# Patient Record
Sex: FEMALE | Race: WHITE | NOT HISPANIC OR LATINO | Employment: STUDENT | URBAN - METROPOLITAN AREA
[De-identification: names, ages, dates, MRNs, and addresses within clinical notes are randomized per-mention and may not be internally consistent; named-entity substitution may affect disease eponyms.]

---
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
## 2018-01-11 NOTE — MISCELLANEOUS
Message  Return to work or school:   Kumar Valenzuela is under my professional care  She was seen in my office on 4/22/16   She is able to return to work on  4/29/16      Bedrest for 1 week          Signatures   Electronically signed by : Ronny Douglas LPN; Apr 18 6098 49:05EQ EST                       (Author)

## 2018-01-15 NOTE — PROGRESS NOTES
Chief Complaint  ppd applied      Active Problems    1  BMI (body mass index), pediatric, 5% to less than 85% for age (V80 51) (Z71 46)   2  Encounter for PPD test (V74 1) (Z11 1)   3  Joint pain (719 40) (M25 50)   4  Lower back pain (724 2) (M54 5)   5  Need for meningococcal vaccination (V03 89) (Z23)   6  Need for prophylactic vaccination and inoculation against influenza (V04 81) (Z23)   7  Urinary tract infection (599 0) (N39 0)   8  Vaginal yeast infection (112 1) (B37 3)    Current Meds   1  No Reported Medications Recorded    Allergies    1  No Known Drug Allergies    2  No Known Latex Allergies    Assessment    1  Encounter for PPD test (V74 1) (Z11 1)    Plan  Encounter for PPD test    · Tubersol 5 UNIT/0 1ML Intradermal Solution    Future Appointments    Date/Time Provider Specialty Site   05/12/2016 03:30 PM [facility], Nurse Schedule  [facility]     Signatures   Electronically signed by :  TONJA Lau ; May 10 2016  5:09PM EST                       (Co-author)

## 2018-01-15 NOTE — PROGRESS NOTES
Assessment    1  Encounter for preventive health examination (V70 0) (Z00 00)   2  Need for meningococcal vaccination (V03 89) (Z23)   3  BMI (body mass index), pediatric, 5% to less than 85% for age (V80 51) (Z71 46)    Plan  Need for meningococcal vaccination    · Menactra Intramuscular Injectable    Discussion/Summary    13 yo F came for a full physical for school paperwork  Gave the booster dose of Menactra  Paperwork was done and given to the   I called the patient to let her know that she needs to come back for her PPD  Chief Complaint  PAPER WORK FOR [employer]      History of Present Illness  HM, 12-18 years Female (Brief): Ivis Foreman presents today for routine health maintenance with her mother  General Health: The child's health since the last visit is described as good   no illness since last visit  Caregiver concerns:   Caregivers deny concerns regarding nutrition, sleep, behavior, school, development and elimination  Nutrition/Elimination:   Sleep:  No sleep issues are reported  Behavior:   Health Risks:  No significant risk factors are identified  Safety elements used:   safety elements were discussed and are adequate  Childcare/School:   Sports Participation Questions:   HPI: 13 yo F came today with mom for a full physical     Diet: Vegan, eat fish sometimes once in a week, drink milk  Dental: See dentist every 6 months  Social, Sleep & Elimination: No issues  Substance abuse: Used marijuana one Ã3 years ago  no cocaine  Sexually active with one partner, uses condom  Review of Systems    Constitutional: No complaints of fever or chills, feels well, no tiredness, no recent weight gain or loss  Eyes: No complaints of eye pain, no discharge, no eyesight problems, eyes do not itch, no red or dry eyes  ENT: no complaints of nasal discharge, no hoarseness, no earache, no nosebleeds, no loss of hearing, no sore throat     Cardiovascular: No complaints of chest pain, no palpitations, normal heart rate, no lower extremity edema  Gastrointestinal: No complaints of abdominal pain, no nausea or vomiting, no constipation, no diarrhea or bloody stools  Genitourinary: No complaints of incontinence, no pelvic pain, no dysuria or dysmenorrhea, no abnormal vaginal bleeding or vaginal discharge  Musculoskeletal: No complaints of limb swelling or limb pain, no myalgias, no joint swelling or joint stiffness  Integumentary: No complaints of skin rash, no skin lesions or wounds, no itching, no breast pain, no breast lump  Neurological: No complaints of headache, no numbness or tingling, no confusion, no dizziness, no limb weakness, no convulsions or fainting, no difficulty walking  Psychiatric: No complaints of feeling depressed, no suicidal thoughts, no emotional problems, no anxiety, no sleep disturbances, no change in personality  Endocrine: No complaints of feeling weak, no muscle weakness, no deepening of voice, no hot flashes or proptosis  Hematologic/Lymphatic: No complaints of swollen glands, no neck swollen glands, does not bleed or bruise easily  Over the past 2 weeks, how often have you been bothered by the following problems? 1 ) Little interest or pleasure in doing things? Several days  2 ) Feeling down, depressed or hopeless? Several days  Active Problems    1  Joint pain (719 40) (M25 50)   2  Lower back pain (724 2) (M54 5)   3  Need for prophylactic vaccination and inoculation against influenza (V04 81) (Z23)   4  Urinary tract infection (599 0) (N39 0)   5  Vaginal yeast infection (112 1) (B37 3)    Family History    · Family history of depression (V17 0) (Z81 8)   · Family history of hypercholesterolemia (V18 19) (Z83 49)    · Family history of Brain tumor    Social History    · Denied: History of Alcohol use   · Always uses seat belt   · Denied: History of Drug use   · Never A Smoker    Current Meds   1   No Reported Medications Recorded    Allergies    1  No Known Drug Allergies    2  No Known Latex Allergies    Vitals   Recorded: 18Apr2016 10:26AM   Temperature 97 6 F   Heart Rate 94   Respiration 18   Systolic 88   Diastolic 52   Height 5 ft 3 5 in   2-20 Stature Percentile 39 %   Weight 117 lb    2-20 Weight Percentile 36 %   BMI Calculated 20 4   BMI Percentile 39 %   BSA Calculated 1 55   O2 Saturation 99   Pain Scale 0     Physical Exam    Constitutional - General appearance: No acute distress, well appearing and well nourished  Head and Face - Head and face: Normocephalic, atraumatic  Palpation of the face and sinuses: Normal, no sinus tenderness  Eyes - Conjunctiva and lids: No injection, edema or discharge  Pupils and irises: Equal, round, reactive to light bilaterally  Ophthalmoscopic examination: Optic discs sharp  Ears, Nose, Mouth, and Throat - External inspection of ears and nose: Normal without deformities or discharge  Otoscopic examination: Tympanic membranes gray, translucent with good bony landmarks and light reflex  Canals patent without erythema  Hearing: Normal  Nasal mucosa, septum, and turbinates: Normal, no edema or discharge  Lips, teeth, and gums: Normal, good dentition  Oropharynx: Moist mucosa, normal tongue and tonsils without lesions  Neck - Neck: Supple, symmetric, no masses  Thyroid: No thyromegaly  Pulmonary - Respiratory effort: Normal respiratory rate and rhythm, no increased work of breathing  Auscultation of lungs: Clear bilaterally  Cardiovascular - Auscultation of heart: Regular rate and rhythm, normal S1 and S2, no murmur  Carotid pulses: Normal, 2+ bilaterally  Abdominal aorta: Normal  Pedal pulses: Normal, 2+ bilaterally  Peripheral vascular exam: Normal  Examination of extremities for edema and/or varicosities: Normal    Abdomen - Abdomen: Normal bowel sounds, soft, non-tender, no masses  Liver and spleen: No hepatomegaly or splenomegaly   Anus, perineum, and rectum: Normal without fissures or lesions  Lymphatic - Palpation of lymph nodes in neck: No anterior or posterior cervical lymphadenopathy  Musculoskeletal - Gait and station: Normal gait  Digits and nails: Normal without clubbing or cyanosis  Inspection/palpation of joints, bones, and muscles: Normal  Evaluation for scoliosis: No scoliosis on exam  Range of motion: Normal  Stability: No joint instability  Muscle strength/tone: Normal    Skin - Skin and subcutaneous tissue: Normal  Palpation of skin and subcutaneous tissue: No rash or lesions  Neurologic - Cranial nerves: Normal  Cortical function: Normal  Reflexes: Normal  Sensation: Normal  Coordination: Normal    Psychiatric - judgment and insight: Normal  Orientation to person, place, and time: Normal       Attending Note  Attending Note: Attending Note: I agree with the Resident management plan as it was presented to me  I agree with the Resident's note        Future Appointments    Date/Time Provider Specialty Site   04/26/2016 01:30 PM Nurse Jessica Schedule  [facility]     Signatures   Electronically signed by : TONJA Wilson ; Apr 19 2016  8:48AM EST                       (Author)    Electronically signed by : NOLAN Kunz ; Apr 23 2016  8:34AM EST                       (Author)

## 2018-01-16 NOTE — PROGRESS NOTES
Chief Complaint  5/12/15 4 pm PPD read as negative 0mm to LFA   Resulted in Immunization sheet and copy given to patient   MARIO Mora PRECIOUS      Active Problems    1  BMI (body mass index), pediatric, 5% to less than 85% for age (V80 51) (Z71 46)   2  Encounter for PPD test (V74 1) (Z11 1)   3  Joint pain (719 40) (M25 50)   4  Lower back pain (724 2) (M54 5)   5  Need for meningococcal vaccination (V03 89) (Z23)   6  Need for prophylactic vaccination and inoculation against influenza (V04 81) (Z23)   7  Urinary tract infection (599 0) (N39 0)   8  Vaginal yeast infection (112 1) (B37 3)    Current Meds   1  No Reported Medications Recorded    Allergies    1  No Known Drug Allergies    2   No Known Latex Allergies    Plan  Encounter for PPD test    · Tubersol 5 UNIT/0 1ML Intradermal Solution    Signatures   Electronically signed by : TONJA Gamino ; May 16 2016  8:50AM EST                       (Author)    Electronically signed by : TONJA Cain ; May 16 2016 10:48AM EST                       (Co-author)

## 2018-01-16 NOTE — MISCELLANEOUS
Message  Return to work or school:   Zechariah Pardo is under my professional care   She was seen in my office on 4/18/16             Signatures   Electronically signed by : TONJA Joseph ; Apr 18 2016 11:18AM EST                       (Author)

## 2018-06-11 ENCOUNTER — OFFICE VISIT (OUTPATIENT)
Dept: FAMILY MEDICINE CLINIC | Facility: CLINIC | Age: 18
End: 2018-06-11
Payer: OTHER GOVERNMENT

## 2018-06-11 VITALS
TEMPERATURE: 97.8 F | WEIGHT: 129 LBS | RESPIRATION RATE: 12 BRPM | BODY MASS INDEX: 22.86 KG/M2 | DIASTOLIC BLOOD PRESSURE: 60 MMHG | SYSTOLIC BLOOD PRESSURE: 98 MMHG | HEART RATE: 64 BPM | HEIGHT: 63 IN

## 2018-06-11 DIAGNOSIS — Z77.011 LEAD EXPOSURE: ICD-10-CM

## 2018-06-11 DIAGNOSIS — Z00.00 ANNUAL PHYSICAL EXAM: Primary | ICD-10-CM

## 2018-06-11 DIAGNOSIS — Z23 NEED FOR TETANUS, DIPHTHERIA, AND ACELLULAR PERTUSSIS (TDAP) VACCINE: ICD-10-CM

## 2018-06-11 PROCEDURE — 99385 PREV VISIT NEW AGE 18-39: CPT | Performed by: NURSE PRACTITIONER

## 2018-06-11 PROCEDURE — 90715 TDAP VACCINE 7 YRS/> IM: CPT

## 2018-06-11 PROCEDURE — 90471 IMMUNIZATION ADMIN: CPT

## 2018-06-11 NOTE — PROGRESS NOTES
FAMILY PRACTICE HEALTH MAINTENANCE OFFICE VISIT  [facility]    NAME: Jacques Farfan  AGE: 19 y o  SEX: female  : 2000     DATE: 2018    Assessment and Plan   1  Annual physical exam  Health maintenance discussed  Diet, exercise, weight management, stress management, etc    All questions addressed, answered, and pt verbalized understanding  Anticipatory guidance  - CBC and differential; Future  - Comprehensive metabolic panel; Future  - TSH, 3rd generation; Future  - Lipid panel; Future  - UA (URINE) with reflex to Microscopic  2  Need for tetanus, diphtheria, and acellular pertussis (Tdap) vaccine  - TDAP VACCINE GREATER THAN OR EQUAL TO 6YO IM      Problem List Items Addressed This Visit     None            · Patient Counseling:   · Nutrition: Stressed importance of a well balanced diet, moderation of sodium/saturated fat, caloric balance and sufficient intake of fiber  · Exercise: Stressed the importance of regular exercise with a goal of 150 minutes per week  · Dental Health: Discussed daily flossing and brushing and regular dental visits   · Alcohol Use:  Recommended moderation of alcohol intake  · Injury Prevention: Discussed Safety Belts, Safety Helmets, and Smoke Detectors    · Immunizations reviewed  Up to date but due for tdap, given today  · Discussed benefits of screening   · Discussed the patient's BMI with her  The BMI 22 85 is in the acceptable range           Chief Complaint     Chief Complaint   Patient presents with    Physical Exam       History of Present Illness     Pt as new pt presents for annual physical   Has forms for job need to be completed  Denies any specific concerns or issues  I have reviewed past medical history, surgical history, medications, allergies, family history, and social history  Denies any significant past medical history     Denies any surgeries    Well Adult Physical   Patient here for a comprehensive physical exam       Diet and Physical Activity  Diet: well balanced diet; Vegan  Weight concerns: None, patient's BMI is between 18 5-24 9  Exercise: intermittently ; is physically active- hikes, walks, rock climbing     Depression Screen  PHQ-9 Depression Screening    PHQ-9:    Frequency of the following problems over the past two weeks:       Little interest or pleasure in doing things:  0 - not at all  Feeling down, depressed, or hopeless:  0 - not at all  PHQ-2 Score:  0          General Health  Hearing: Normal:  bilateral  Vision: wears glasses  Dental: no dental visits for >1 year ; discussed importance and pt agreeable to same  Reproductive Health  Gets routing gyn care at college: [address] [place] [address]      The following portions of the patient's history were reviewed and updated as appropriate: allergies, current medications, past family history, past medical history, past social history, past surgical history and problem list     Review of Systems     Review of Systems   Constitutional: Negative for chills, diaphoresis, fatigue and fever  HENT: Negative for congestion, ear pain, sinus pain, sinus pressure and sore throat  Eyes: Negative for visual disturbance  Respiratory: Negative for cough, chest tightness, shortness of breath and wheezing  Cardiovascular: Negative for chest pain, palpitations and leg swelling  Gastrointestinal: Negative for abdominal distention, abdominal pain, diarrhea and nausea  Genitourinary: Negative for dysuria, frequency and urgency  Musculoskeletal: Negative for arthralgias, back pain and myalgias  Neurological: Negative for dizziness, weakness and headaches  Hematological: Negative for adenopathy  Psychiatric/Behavioral: Negative for agitation  The patient is not nervous/anxious  All other systems reviewed and are negative  Past Medical History   none      Past Surgical History   none  No past surgical history on file      Social History Social History     Social History    Marital status: Single     Spouse name: N/A    Number of children: N/A    Years of education: N/A     Social History Main Topics    Smoking status: Never Smoker    Smokeless tobacco: Never Used    Alcohol use No    Drug use: No    Sexual activity: Not Asked     Other Topics Concern    None     Social History Narrative    Always uses seat belt            Family History     Family History   Problem Relation Age of Onset    Depression Mother     Hyperlipidemia Mother     Other Father      Brain Tumor        Current Medications     No current outpatient prescriptions on file  Allergies     No Known Allergies    Objective     BP 98/60 (BP Location: Left arm, Patient Position: Sitting, Cuff Size: Standard)   Pulse 64   Temp 97 8 °F (36 6 °C) (Temporal)   Resp 12   Ht 5' 3" (1 6 m)   Wt 58 5 kg (129 lb)   LMP 06/06/2018   BMI 22 85 kg/m²      Physical Exam   Constitutional: She is oriented to person, place, and time  She appears well-developed and well-nourished  HENT:   Head: Normocephalic and atraumatic  Mouth/Throat: Oropharynx is clear and moist    Eyes: EOM are normal  Pupils are equal, round, and reactive to light  Neck: Normal range of motion  Neck supple  No thyromegaly present  Cardiovascular: Normal rate, regular rhythm and normal heart sounds  No murmur heard  Pulmonary/Chest: Effort normal and breath sounds normal  No respiratory distress  She has no wheezes  She has no rales  Abdominal: Soft  Bowel sounds are normal  She exhibits no distension  There is no tenderness  Musculoskeletal: Normal range of motion  She exhibits no edema  Lymphadenopathy:     She has no cervical adenopathy  Neurological: She is alert and oriented to person, place, and time  Skin: Skin is warm and dry  Psychiatric: She has a normal mood and affect   Her behavior is normal  Thought content normal              Health Maintenance     There are no preventive care reminders to display for this patient    Immunization History   Administered Date(s) Administered    DTaP 5 2000, 2000, 10/08/1999, 12/04/2001, 04/23/2003    HPV Quadrivalent 12/26/2012    Hep B, adult 1998, 10/08/1999, 04/23/2003    Hib (PRP-OMP) 1998, 10/08/1999    IPV 1998, 1998, 10/08/1999, 04/23/2003    Influenza Quadrivalent Preservative Free 3 years and older IM 11/04/2014    Influenza TIV (IM) 12/26/2012    MMR 10/08/1999, 04/23/2003    Meningococcal, Unknown Serogroups 09/14/2009, 04/18/2016    Tdap 09/14/2009    Tuberculin Skin Test-PPD Intradermal 12/04/2001, 05/10/2016    Varicella 1998       Reyna [place], [address] [facility] [address] [place]